# Patient Record
Sex: MALE | Race: WHITE | Employment: UNEMPLOYED | URBAN - METROPOLITAN AREA
[De-identification: names, ages, dates, MRNs, and addresses within clinical notes are randomized per-mention and may not be internally consistent; named-entity substitution may affect disease eponyms.]

---

## 2024-02-10 ENCOUNTER — HOSPITAL ENCOUNTER (EMERGENCY)
Age: 34
Discharge: HOME | End: 2024-02-10
Payer: OTHER GOVERNMENT

## 2024-02-10 VITALS
BODY MASS INDEX: 20.02 KG/M2 | OXYGEN SATURATION: 100 % | DIASTOLIC BLOOD PRESSURE: 78 MMHG | HEART RATE: 93 BPM | SYSTOLIC BLOOD PRESSURE: 115 MMHG | RESPIRATION RATE: 17 BRPM | TEMPERATURE: 98.1 F | WEIGHT: 113 LBS | HEIGHT: 63 IN

## 2024-02-10 DIAGNOSIS — F41.1 ANXIETY STATE: Primary | ICD-10-CM

## 2024-02-10 PROCEDURE — 99283 EMERGENCY DEPT VISIT LOW MDM: CPT

## 2024-02-10 PROCEDURE — 6370000000 HC RX 637 (ALT 250 FOR IP): Performed by: NURSE PRACTITIONER

## 2024-02-10 RX ORDER — 0.9 % SODIUM CHLORIDE 0.9 %
1000 INTRAVENOUS SOLUTION INTRAVENOUS ONCE
Status: DISCONTINUED | OUTPATIENT
Start: 2024-02-10 | End: 2024-02-10

## 2024-02-10 RX ADMIN — DIPHENHYDRAMINE HYDROCHLORIDE 50 MG: 12.5 SOLUTION ORAL at 20:32

## 2024-02-10 ASSESSMENT — PAIN - FUNCTIONAL ASSESSMENT: PAIN_FUNCTIONAL_ASSESSMENT: 0-10

## 2024-02-10 NOTE — ED TRIAGE NOTES
Patient ambulatory in ED with complaint of a panic attack. [facility] but medication given hasn't helped with his panic attacks. He feels very nervous and has trouble swallowing pills due to hx of choking attacks in the past when swallowing. He has lost a rapid amount of weight over the past few weeks.

## 2024-02-11 NOTE — DISCHARGE INSTRUCTIONS
Follow-up with recommended provider in the next 1-2 days.  Return to the ED immediately for any new, worsening, concerning symptoms; or for danger signs as discussed.

## 2024-02-11 NOTE — ED PROVIDER NOTES
Emergency Department Provider Note       PCP: None, None   Age: 33 y.o.   Sex: male     DISPOSITION Decision To Discharge 02/10/2024 08:39:01 PM       ICD-10-CM    1. Anxiety state  F41.1 [facility] (Psychiatry)          Medical Decision Making     Complexity of Problems Addressed:  1 or more acute illnesses that pose a threat to life or bodily function.   1 or more chronic illnesses that pose a threat to life or bodily function.    Data Reviewed and Analyzed:  I independently ordered and reviewed each unique test.  I reviewed external records: ED visit note from an outside group.           Discussion of management or test interpretation.  As in HPI.  There is unfortunately a very lengthy wait time for the patient was placed in exam room.  On my evaluation of the patient, he states feeling much improved since arriving in the ED.  Denies any sensation of choking, chest pain, racing heart, palpitations and states he is no longer feeling anxious or \"panic attack.\"  He has attempted no therapeutic measures prior to ED arrival.  I have ordered a broad workup to include labs, electrolytes, TSH, EKG and considered CT scan of the abdomen, other.  Have ordered IV fluids and plan for IV medications.  Patient states that he is feeling much improved.  He is declining all recommended labs, imaging workup and management that I have offered.  Discussed utility of this workup and risks associated with failure to complete recommend medical workup which could include death or permanent disability, worsening of condition.  He verbalized understanding of this.  States feeling much better.  States he would like a single dose of oral Benadryl liquid and then be discharged home.  This was provided and he is requesting prompt discharge.  Low clinical suspicion of behavioral health emergency, overdose, thyroid storm, life-threatening arrhythmia, ACS or other acute emergent process.  To follow-up with PCP and

## 2024-02-11 NOTE — ED NOTES
I have reviewed discharge instructions with the patient.  The patient verbalized understanding.    Patient left ED via Discharge Method: ambulatory to Home with self.    Opportunity for questions and clarification provided.       Patient given 0 scripts.         To continue your aftercare when you leave the hospital, you may receive an automated call from our care team to check in on how you are doing.  This is a free service and part of our promise to provide the best care and service to meet your aftercare needs.” If you have questions, or wish to unsubscribe from this service please call [phone].  Thank you for Choosing our [facility] Emergency Department.

## 2024-02-15 ENCOUNTER — APPOINTMENT (OUTPATIENT)
Dept: CT IMAGING | Age: 34
End: 2024-02-15
Payer: OTHER GOVERNMENT

## 2024-02-15 ENCOUNTER — HOSPITAL ENCOUNTER (EMERGENCY)
Age: 34
Discharge: HOME | End: 2024-02-15
Attending: EMERGENCY MEDICINE
Payer: OTHER GOVERNMENT

## 2024-02-15 VITALS
RESPIRATION RATE: 18 BRPM | TEMPERATURE: 98 F | OXYGEN SATURATION: 100 % | HEART RATE: 87 BPM | BODY MASS INDEX: 20.02 KG/M2 | HEIGHT: 63 IN | DIASTOLIC BLOOD PRESSURE: 85 MMHG | SYSTOLIC BLOOD PRESSURE: 100 MMHG | WEIGHT: 113 LBS

## 2024-02-15 DIAGNOSIS — R10.13 ABDOMINAL PAIN, EPIGASTRIC: Primary | ICD-10-CM

## 2024-02-15 DIAGNOSIS — K21.9 GASTROESOPHAGEAL REFLUX DISEASE WITHOUT ESOPHAGITIS: ICD-10-CM

## 2024-02-15 LAB
ALBUMIN SERPL-MCNC: 4.7 G/DL (ref 3.5–5)
ALBUMIN/GLOB SERPL: 1.6 (ref 0.4–1.6)
ALP SERPL-CCNC: 81 U/L (ref 45–117)
ALT SERPL-CCNC: 11 U/L (ref 13–61)
ANION GAP SERPL CALC-SCNC: 11 MMOL/L (ref 2–11)
AST SERPL-CCNC: 17 U/L (ref 15–37)
BASOPHILS # BLD: 0.1 K/UL (ref 0–0.2)
BASOPHILS NFR BLD: 1 % (ref 0–2)
BILIRUB SERPL-MCNC: 0.4 MG/DL (ref 0.2–1.1)
BUN SERPL-MCNC: 11 MG/DL (ref 6–23)
CALCIUM SERPL-MCNC: 10 MG/DL (ref 8.3–10.4)
CHLORIDE SERPL-SCNC: 101 MMOL/L (ref 98–107)
CO2 SERPL-SCNC: 28 MMOL/L (ref 21–32)
CREAT SERPL-MCNC: 0.76 MG/DL (ref 0.8–1.5)
DIFFERENTIAL METHOD BLD: NORMAL
EOSINOPHIL # BLD: 0.3 K/UL (ref 0–0.8)
EOSINOPHIL NFR BLD: 5 % (ref 0.5–7.8)
ERYTHROCYTE [DISTWIDTH] IN BLOOD BY AUTOMATED COUNT: 13.2 % (ref 11.9–14.6)
GLOBULIN SER CALC-MCNC: 3 G/DL (ref 2.8–4.5)
GLUCOSE SERPL-MCNC: 91 MG/DL (ref 65–100)
HCT VFR BLD AUTO: 45.3 % (ref 41.1–50.3)
HGB BLD-MCNC: 15.7 G/DL (ref 13.6–17.2)
IMM GRANULOCYTES # BLD AUTO: 0 K/UL (ref 0–0.5)
IMM GRANULOCYTES NFR BLD AUTO: 0 % (ref 0–5)
LIPASE SERPL-CCNC: 53 U/L (ref 13–60)
LYMPHOCYTES # BLD: 1.7 K/UL (ref 0.5–4.6)
LYMPHOCYTES NFR BLD: 27 % (ref 13–44)
MAGNESIUM SERPL-MCNC: 2.3 MG/DL (ref 1.2–2.6)
MCH RBC QN AUTO: 29.2 PG (ref 26.1–32.9)
MCHC RBC AUTO-ENTMCNC: 34.7 G/DL (ref 31.4–35)
MCV RBC AUTO: 84.2 FL (ref 82–102)
MONOCYTES # BLD: 0.4 K/UL (ref 0.1–1.3)
MONOCYTES NFR BLD: 7 % (ref 4–12)
NEUTS SEG # BLD: 3.9 K/UL (ref 1.7–8.2)
NEUTS SEG NFR BLD: 60 % (ref 43–78)
NRBC # BLD: 0 K/UL (ref 0–0.2)
PLATELET # BLD AUTO: 371 K/UL (ref 150–450)
PMV BLD AUTO: 9.4 FL (ref 9.4–12.3)
POTASSIUM SERPL-SCNC: 4 MMOL/L (ref 3.5–5.1)
PROT SERPL-MCNC: 7.7 G/DL (ref 6.4–8.2)
RBC # BLD AUTO: 5.38 M/UL (ref 4.23–5.6)
SODIUM SERPL-SCNC: 140 MMOL/L (ref 133–143)
TROPONIN T SERPL HS-MCNC: <6 NG/L (ref 0–22)
WBC # BLD AUTO: 6.5 K/UL (ref 4.3–11.1)

## 2024-02-15 PROCEDURE — 6360000004 HC RX CONTRAST MEDICATION: Performed by: EMERGENCY MEDICINE

## 2024-02-15 PROCEDURE — 83735 ASSAY OF MAGNESIUM: CPT

## 2024-02-15 PROCEDURE — 84484 ASSAY OF TROPONIN QUANT: CPT

## 2024-02-15 PROCEDURE — 74177 CT ABD & PELVIS W/CONTRAST: CPT

## 2024-02-15 PROCEDURE — 99285 EMERGENCY DEPT VISIT HI MDM: CPT

## 2024-02-15 PROCEDURE — 85025 COMPLETE CBC W/AUTO DIFF WBC: CPT

## 2024-02-15 PROCEDURE — 80053 COMPREHEN METABOLIC PANEL: CPT

## 2024-02-15 PROCEDURE — 83690 ASSAY OF LIPASE: CPT

## 2024-02-15 RX ORDER — FAMOTIDINE 20 MG/1
20 TABLET, FILM COATED ORAL 2 TIMES DAILY
Qty: 60 TABLET | Refills: 0 | Status: SHIPPED | OUTPATIENT
Start: 2024-02-15

## 2024-02-15 RX ADMIN — IOPAMIDOL 100 ML: 755 INJECTION, SOLUTION INTRAVENOUS at 17:04

## 2024-02-15 RX ADMIN — DIATRIZOATE MEGLUMINE AND DIATRIZOATE SODIUM 15 ML: 660; 100 LIQUID ORAL; RECTAL at 15:45

## 2024-02-15 NOTE — ED NOTES
I have reviewed discharge instructions with the patient.  The patient verbalized understanding.    Patient left ED via Discharge Method: ambulatory to Home with self.    Opportunity for questions and clarification provided.       Patient given 1 scripts.         To continue your aftercare when you leave the hospital, you may receive an automated call from our care team to check in on how you are doing.  This is a free service and part of our promise to provide the best care and service to meet your aftercare needs.” If you have questions, or wish to unsubscribe from this service please call [phone].  Thank you for Choosing our [facility] Emergency Department.

## 2024-02-15 NOTE — DISCHARGE INSTRUCTIONS
[facility]  Begin taking Pepcid.  Twice a day.  Return for worsening symptoms.

## 2024-02-15 NOTE — ED PROVIDER NOTES
Emergency Department Provider Note       PCP: None, None   Age: 33 y.o.   Sex: male     DISPOSITION Decision To Discharge 02/15/2024 06:47:22 PM       ICD-10-CM    1. Abdominal pain, epigastric  R10.13       2. Gastroesophageal reflux disease without esophagitis  K21.9           Medical Decision Making     Complexity of Problems Addressed:  1 or more chronic illnesses with a severe exacerbation or progression.  1 or more acute illnesses that pose a threat to life or bodily function.     Data Reviewed and Analyzed:   I independently ordered and reviewed each unique test.  I reviewed external records: ED visit note from an outside group.  I reviewed external records: previous imaging study including radiologist interpretation.       I independently ordered and interpreted the ED EKG in the absence of a Cardiologist.    EKG obtained interpreted by me.  Rate of 95.  Sinus arrhythmia noted.  Normal axis.  No STEMI criteria.      I interpreted the CT Scan agree radiologist interpretation.  No acute surgical finding.  Mild constipation noted..  I interpreted the labs.    Discussion of management or test interpretation.  No pain at this time.  Recurrent epigastric pain.  Recent ultrasound negative.  Has been ongoing for months if not years.  Will obtain CT to assess for any mass, pancreatic pathology that has not been identified.  He is not jaundiced.   EKG fairly unremarkable.  Labs obtained.  Fairly nonspecific.  Low suspicion for ACS, pneumonia, pneumothorax, dissection.     10:51 PM  Troponin negative.  EKG unremarkable.  Low suspicion for ACS.  CT obtained due to recurrent history however no acute finding other than constipation.  Recommend MiraLAX and Colace and plenty of fluid.  He has it at home.  He is in agreement.  Although his symptoms could also be related to acid reflux.  I recommend beginning Pepcid.  I will write prescription for 30 days.  He is in agreement.  Will discharge home at this time.  Return

## 2024-02-24 ENCOUNTER — HOSPITAL ENCOUNTER (EMERGENCY)
Age: 34
Discharge: HOME | End: 2024-02-24
Payer: OTHER GOVERNMENT

## 2024-02-24 VITALS
RESPIRATION RATE: 16 BRPM | BODY MASS INDEX: 19.84 KG/M2 | DIASTOLIC BLOOD PRESSURE: 80 MMHG | WEIGHT: 112 LBS | HEIGHT: 63 IN | TEMPERATURE: 98.4 F | OXYGEN SATURATION: 100 % | HEART RATE: 82 BPM | SYSTOLIC BLOOD PRESSURE: 108 MMHG

## 2024-02-24 DIAGNOSIS — R45.89 FEELING ANXIOUS: Primary | ICD-10-CM

## 2024-02-24 DIAGNOSIS — R10.9 ABDOMINAL DISCOMFORT: ICD-10-CM

## 2024-02-24 PROCEDURE — 6370000000 HC RX 637 (ALT 250 FOR IP)

## 2024-02-24 PROCEDURE — 99283 EMERGENCY DEPT VISIT LOW MDM: CPT

## 2024-02-24 RX ORDER — SUCRALFATE 1 G/1
1 TABLET ORAL 3 TIMES DAILY
Qty: 21 TABLET | Refills: 0 | Status: SHIPPED | OUTPATIENT
Start: 2024-02-24 | End: 2024-03-02

## 2024-02-24 RX ORDER — HYDROXYZINE HYDROCHLORIDE 25 MG/1
25 TABLET, FILM COATED ORAL EVERY 8 HOURS PRN
Qty: 30 TABLET | Refills: 0 | Status: SHIPPED | OUTPATIENT
Start: 2024-02-24 | End: 2024-03-05

## 2024-02-24 RX ORDER — SUCRALFATE 1 G/1
1 TABLET ORAL
Status: COMPLETED | OUTPATIENT
Start: 2024-02-24 | End: 2024-02-24

## 2024-02-24 RX ORDER — HYDROXYZINE PAMOATE 25 MG/1
50 CAPSULE ORAL
Status: COMPLETED | OUTPATIENT
Start: 2024-02-24 | End: 2024-02-24

## 2024-02-24 RX ADMIN — HYDROXYZINE PAMOATE 50 MG: 25 CAPSULE ORAL at 20:49

## 2024-02-24 RX ADMIN — SUCRALFATE 1 G: 1 TABLET ORAL at 20:50

## 2024-02-24 ASSESSMENT — PAIN DESCRIPTION - DESCRIPTORS: DESCRIPTORS: DISCOMFORT

## 2024-02-24 ASSESSMENT — PAIN DESCRIPTION - LOCATION: LOCATION: ABDOMEN

## 2024-02-24 ASSESSMENT — PAIN SCALES - GENERAL: PAINLEVEL_OUTOF10: 3

## 2024-02-24 ASSESSMENT — PAIN - FUNCTIONAL ASSESSMENT: PAIN_FUNCTIONAL_ASSESSMENT: 0-10

## 2024-02-25 NOTE — ED NOTES
I have reviewed discharge instructions with the patient.  The patient verbalized understanding.    Patient left ED via Discharge Method: ambulatory to Home with self    Opportunity for questions and clarification provided.       Patient given 2 scripts.         To continue your aftercare when you leave the hospital, you may receive an automated call from our care team to check in on how you are doing.  This is a free service and part of our promise to provide the best care and service to meet your aftercare needs.” If you have questions, or wish to unsubscribe from this service please call [phone].  Thank you for Choosing our [facility] Emergency Department.

## 2024-02-25 NOTE — ED TRIAGE NOTES
Pt ambulatory to triage. Pt reports anxiety/panic attacks and abdominal discomfort that started this afternoon. Pt states he took pepcid today. Pt states he has been prescribed hydroxine but has not picked it up yet

## 2024-02-25 NOTE — DISCHARGE INSTRUCTIONS
You were evaluated in the emergency department today for feeling anxious and stomach discomfort.    You are given Carafate that can be dissolved in water.  I have written you prescription for the same medication    You are given hydroxyzine tabs that are small.  I have written you prescription to hold you over until you get your hydroxyzine liquid from your care provider.    I do recommend you have a follow-up with your actual primary care provider and have a discussion about some SSRIs or SNRIs to try to prevent anxiety.  I do think this long-term medication will help prevent panic attacks.  I do believe that this is worth a discussion.

## 2024-02-25 NOTE — ED PROVIDER NOTES
the making of this note.  This software is not perfect and grammatical and other typographical errors may be present.  This note has not been completely proofread for errors.       Vaishnavi Jones PA  02/24/24 [id number]

## 2024-02-29 ENCOUNTER — HOSPITAL ENCOUNTER (EMERGENCY)
Age: 34
Discharge: HOME | End: 2024-02-29
Payer: OTHER GOVERNMENT

## 2024-02-29 VITALS
TEMPERATURE: 98.3 F | HEIGHT: 63 IN | HEART RATE: 84 BPM | BODY MASS INDEX: 20.02 KG/M2 | WEIGHT: 113 LBS | RESPIRATION RATE: 16 BRPM | SYSTOLIC BLOOD PRESSURE: 106 MMHG | OXYGEN SATURATION: 99 % | DIASTOLIC BLOOD PRESSURE: 86 MMHG

## 2024-02-29 DIAGNOSIS — F41.1 ANXIETY STATE: Primary | ICD-10-CM

## 2024-02-29 PROCEDURE — 99282 EMERGENCY DEPT VISIT SF MDM: CPT

## 2024-02-29 RX ORDER — SERTRALINE HYDROCHLORIDE 25 MG/1
25 TABLET, FILM COATED ORAL DAILY
COMMUNITY

## 2024-02-29 ASSESSMENT — PAIN SCALES - GENERAL: PAINLEVEL_OUTOF10: 0

## 2024-02-29 ASSESSMENT — PAIN - FUNCTIONAL ASSESSMENT
PAIN_FUNCTIONAL_ASSESSMENT: NONE - DENIES PAIN
PAIN_FUNCTIONAL_ASSESSMENT: 0-10

## 2024-02-29 NOTE — DISCHARGE INSTRUCTIONS
Continue at home medications.  The  side effects of the Zoloft should start to fade over the next 12 to 14 days.  Call your primary health or mental health provider in the morning to inform of ER visit.  Return for any worsening symptoms

## 2024-02-29 NOTE — ED TRIAGE NOTES
Pt arrives for concerns for medication reaction possible to Zoloft. Pt reports he was recently prescribed this medication and states he took the first dose yesterday. Pt reports having \"a full body burning sensation and then it was just a burning in my face and shoulder\" yesterday. Pt reports today he had a panic attack while getting his hair cut. Reports some anxiety at this time but denies CP or SOB. Did not take med today. No hives/rash reported.

## 2024-03-01 NOTE — ED PROVIDER NOTES
5:57 PM           Past Medical History:   Diagnosis Date    Anxiety         History reviewed. No pertinent surgical history.     Social History     Socioeconomic History    Marital status: Single     Spouse name: None    Number of children: None    Years of education: None    Highest education level: None   Tobacco Use    Smoking status: Never    Smokeless tobacco: Never   Substance and Sexual Activity    Alcohol use: Never    Drug use: Never        Discharge Medication List as of 2/29/2024  5:57 PM        CONTINUE these medications which have NOT CHANGED    Details   sertraline (ZOLOFT) 25 MG tablet Take 1 tablet by mouth dailyHistorical Med      sucralfate (CARAFATE) 1 GM tablet Take 1 tablet by mouth in the morning, at noon, and at bedtime for 7 days, Disp-21 tablet, R-0Normal      hydrOXYzine HCl (ATARAX) 25 MG tablet Take 1 tablet by mouth every 8 hours as needed for Itching, Disp-30 tablet, R-0Normal      famotidine (PEPCID) 20 MG tablet Take 1 tablet by mouth 2 times daily, Disp-60 tablet, R-0Print              No results found for any visits on 02/29/24.      No orders to display                No results for input(s): \"COVID19\" in the last 72 hours.    Voice dictation software was used during the making of this note.  This software is not perfect and grammatical and other typographical errors may be present.  This note has not been completely proofread for errors.      Soledad Jennings PA  02/29/24 [id number]